# Patient Record
Sex: FEMALE | Race: WHITE | Employment: FULL TIME | URBAN - METROPOLITAN AREA
[De-identification: names, ages, dates, MRNs, and addresses within clinical notes are randomized per-mention and may not be internally consistent; named-entity substitution may affect disease eponyms.]

---

## 2022-01-18 ENCOUNTER — OFFICE VISIT (OUTPATIENT)
Dept: DERMATOLOGY | Age: 37
End: 2022-01-18
Payer: COMMERCIAL

## 2022-01-18 VITALS — TEMPERATURE: 97.2 F

## 2022-01-18 DIAGNOSIS — D18.01 CHERRY ANGIOMA: ICD-10-CM

## 2022-01-18 DIAGNOSIS — L81.4 SOLAR LENTIGO: Primary | ICD-10-CM

## 2022-01-18 DIAGNOSIS — D22.9 MULTIPLE NEVI: ICD-10-CM

## 2022-01-18 PROCEDURE — 99203 OFFICE O/P NEW LOW 30 MIN: CPT | Performed by: DERMATOLOGY

## 2022-01-18 RX ORDER — CALCIUM CARBONATE 500(1250)
500 TABLET ORAL DAILY
COMMUNITY

## 2022-01-18 NOTE — PROGRESS NOTES
[facility]ermat[facility]  Ander Hare MD  [phone]      Robinson Leon  1985    43 y.o. female     Date of Visit: 1/18/2022    Chief Complaint: skin moles    History of Present Illness:    1. She reports a couple of asymptomatic lesions on the lateral portions of the cheeks. 2.  She has several moles on the trunk and extremities-not aware of any changes in size, color, or shape. 3.  She also has several asymptomatic red moles on the chest and abdomen. She runs outdoors frequently. Reports freckling on the back and shoulders. No personal or family history of skin cancer. Wears sunscreen. Review of Systems:  Gen: Feels well, good sense of health. Past Medical History, Family History, Surgical History, Medications and Allergies reviewed. History reviewed. No pertinent past medical history. History reviewed. No pertinent surgical history. No Known Allergies  Outpatient Medications Marked as Taking for the 1/18/22 encounter (Office Visit) with July Carpenter MD   Medication Sig Dispense Refill    levonorgestrel [facility] [place]) IUD 52 mg 1 each by IntraUTERine route once      MULTIPLE MINERALS-VITAMINS PO Take by mouth      CALCIUM-VITAMIN D PO Take by mouth      calcium carbonate (OSCAL) 500 MG TABS tablet Take 500 mg by mouth daily         Social History:  Occupation:  .  Works full time. Has 2 kids 3 and 6.  - Rich. Physical Examination       The following were examined and determined to be normal: Psych/Neuro, Scalp/hair, Head/face, Conjunctivae/eyelids, Gums/teeth/lips, Neck, Breast/axilla/chest, Abdomen, Back, RUE, LUE, RLE, LLE and Nails/digits. The following were examined and determined to be abnormal: None. Well-appearing. 1.  Lateral portions of the cheeks with few round tan macules. 2.  Trunk and extremities with scattered well-defined round oval uniformly brown macules.     3.  Chest and abdomen with scattered bright red macules. Assessment and Plan     1. Solar lentigines    Monitor for change. Encouraged regular sunscreen use. 2. Multiple nevi - benign appearing    Sun protective behaviors, including use of at least SPF 30 sunscreen, and self skin examinations were encouraged. Call for any new or concerning lesions. 3. Cherry angiomas    Reassurance.           --Ericka Candelaria MD